# Patient Record
Sex: MALE | URBAN - METROPOLITAN AREA
[De-identification: names, ages, dates, MRNs, and addresses within clinical notes are randomized per-mention and may not be internally consistent; named-entity substitution may affect disease eponyms.]

---

## 2023-10-17 ENCOUNTER — APPOINTMENT (RX ONLY)
Dept: URBAN - METROPOLITAN AREA CLINIC 134 | Facility: CLINIC | Age: 43
Setting detail: DERMATOLOGY
End: 2023-10-17

## 2023-10-17 DIAGNOSIS — L72.0 EPIDERMAL CYST: ICD-10-CM

## 2023-10-17 DIAGNOSIS — L73.8 OTHER SPECIFIED FOLLICULAR DISORDERS: ICD-10-CM

## 2023-10-17 DIAGNOSIS — L21.8 OTHER SEBORRHEIC DERMATITIS: ICD-10-CM | Status: INADEQUATELY CONTROLLED

## 2023-10-17 DIAGNOSIS — B07.8 OTHER VIRAL WARTS: ICD-10-CM

## 2023-10-17 DIAGNOSIS — L72.8 OTHER FOLLICULAR CYSTS OF THE SKIN AND SUBCUTANEOUS TISSUE: ICD-10-CM | Status: STABLE

## 2023-10-17 DIAGNOSIS — L82.1 OTHER SEBORRHEIC KERATOSIS: ICD-10-CM

## 2023-10-17 PROCEDURE — ? PRESCRIPTION

## 2023-10-17 PROCEDURE — ? PRESCRIPTION MEDICATION MANAGEMENT

## 2023-10-17 PROCEDURE — 17110 DESTRUCTION B9 LES UP TO 14: CPT

## 2023-10-17 PROCEDURE — ? TREATMENT REGIMEN

## 2023-10-17 PROCEDURE — ? COUNSELING

## 2023-10-17 PROCEDURE — ? LIQUID NITROGEN

## 2023-10-17 PROCEDURE — 99204 OFFICE O/P NEW MOD 45 MIN: CPT | Mod: 25

## 2023-10-17 RX ORDER — KETOCONAZOLE 20 MG/ML
SHAMPOO, SUSPENSION TOPICAL
Qty: 120 | Refills: 3 | Status: ERX | COMMUNITY
Start: 2023-10-17

## 2023-10-17 RX ADMIN — KETOCONAZOLE: 20 SHAMPOO, SUSPENSION TOPICAL at 00:00

## 2023-10-17 ASSESSMENT — LOCATION ZONE DERM
LOCATION ZONE: SCALP
LOCATION ZONE: FACE
LOCATION ZONE: LIP
LOCATION ZONE: TRUNK
LOCATION ZONE: FINGER
LOCATION ZONE: ARM
LOCATION ZONE: NOSE
LOCATION ZONE: LEG

## 2023-10-17 ASSESSMENT — LOCATION DETAILED DESCRIPTION DERM
LOCATION DETAILED: LEFT DISTAL DORSAL FOREARM
LOCATION DETAILED: RIGHT ANTERIOR DISTAL THIGH
LOCATION DETAILED: RIGHT SUPERIOR UPPER BACK
LOCATION DETAILED: RIGHT MIDDLE DISTAL INTERPHALANGEAL JOINT
LOCATION DETAILED: NASAL ROOT
LOCATION DETAILED: LEFT SUPERIOR MEDIAL MALAR CHEEK
LOCATION DETAILED: LEFT MEDIAL FRONTAL SCALP
LOCATION DETAILED: LEFT SUPERIOR VERMILION LIP
LOCATION DETAILED: LEFT PROXIMAL DORSAL FOREARM

## 2023-10-17 ASSESSMENT — LOCATION SIMPLE DESCRIPTION DERM
LOCATION SIMPLE: NOSE
LOCATION SIMPLE: RIGHT THIGH
LOCATION SIMPLE: RIGHT MIDDLE FINGER
LOCATION SIMPLE: LEFT CHEEK
LOCATION SIMPLE: RIGHT UPPER BACK
LOCATION SIMPLE: LEFT SCALP
LOCATION SIMPLE: LEFT FOREARM
LOCATION SIMPLE: LEFT LIP

## 2023-10-17 NOTE — PROCEDURE: TREATMENT REGIMEN
Plan: Discussed treatments for inflammation such as incision and drainage, intralesional kenalog, and oral antibiotics as needed.\\nFesmerher discussed most definitive treatment with excision.\\nPatient will continue to manually express cyst when enlarges and will follow up as needed for flares.  He denies history of inflammation.
Detail Level: Detailed

## 2023-10-17 NOTE — PROCEDURE: LIQUID NITROGEN
Number Of Freeze-Thaw Cycles: 3 freeze-thaw cycles
Consent: The patient's verbal consent was obtained including but not limited to risks of crusting, scabbing, blistering, scarring, darker or lighter pigmentary change, recurrence, incomplete removal and infection.
Render Note In Bullet Format When Appropriate: No
Show Aperture Variable?: Yes
Medical Necessity Clause: This procedure was medically necessary because the lesions that were treated were:
Duration Of Freeze Thaw-Cycle (Seconds): 5-10
Post-Care Instructions: I reviewed with the patient in detail post-care instructions. Patient is to wear sunprotection, and avoid picking at any of the treated lesions. Pt may apply Vaseline to crusted or scabbing areas.
Medical Necessity Information: It is in your best interest to select a reason for this procedure from the list below. All of these items fulfill various CMS LCD requirements except the new and changing color options.
Detail Level: Detailed
Spray Paint Text: The liquid nitrogen was applied to the skin utilizing a spray paint frosting technique.

## 2023-10-17 NOTE — PROCEDURE: PRESCRIPTION MEDICATION MANAGEMENT
Continue Regimen: Neutragena T-sal shampoo
Detail Level: Detailed
Initiate Treatment: ketoconazole 2 % shampoo \\nQuantity: 120.0 ml  Days Supply: 30\\nSig: Lather into affected areas of scalp and face, leave on 3-5 minutes, then rinse 3-5 days per week for flare and taper with improvement.
Plan: Follow up in 1 month if no improvement or worsens\\nDiscussed adding topical steroid if continued flaring
Render In Strict Bullet Format?: No